# Patient Record
Sex: FEMALE | Race: AMERICAN INDIAN OR ALASKA NATIVE
[De-identification: names, ages, dates, MRNs, and addresses within clinical notes are randomized per-mention and may not be internally consistent; named-entity substitution may affect disease eponyms.]

---

## 2020-01-18 ENCOUNTER — HOSPITAL ENCOUNTER (OUTPATIENT)
Dept: HOSPITAL 5 - ED | Age: 20
Setting detail: OBSERVATION
Discharge: HOME | End: 2020-01-18
Attending: INTERNAL MEDICINE | Admitting: INTERNAL MEDICINE
Payer: COMMERCIAL

## 2020-01-18 VITALS — DIASTOLIC BLOOD PRESSURE: 83 MMHG | SYSTOLIC BLOOD PRESSURE: 131 MMHG

## 2020-01-18 DIAGNOSIS — R42: ICD-10-CM

## 2020-01-18 DIAGNOSIS — F12.90: ICD-10-CM

## 2020-01-18 DIAGNOSIS — G35: Primary | ICD-10-CM

## 2020-01-18 LAB
ALBUMIN SERPL-MCNC: 4.5 G/DL (ref 3.9–5)
ALT SERPL-CCNC: 9 UNITS/L (ref 7–56)
BASOPHILS # (AUTO): 0 K/MM3 (ref 0–0.1)
BASOPHILS NFR BLD AUTO: 0.8 % (ref 0–1.8)
BUN SERPL-MCNC: 17 MG/DL (ref 7–17)
BUN/CREAT SERPL: 19 %
CALCIUM SERPL-MCNC: 9.6 MG/DL (ref 8.4–10.2)
EOSINOPHIL # BLD AUTO: 0.2 K/MM3 (ref 0–0.4)
EOSINOPHIL NFR BLD AUTO: 2.6 % (ref 0–4.3)
HCT VFR BLD CALC: 40.1 % (ref 30.3–42.9)
HEMOLYSIS INDEX: 11
HGB BLD-MCNC: 13.5 GM/DL (ref 10.1–14.3)
LYMPHOCYTES # BLD AUTO: 1.6 K/MM3 (ref 1.2–5.4)
LYMPHOCYTES NFR BLD AUTO: 25.9 % (ref 13.4–35)
MCHC RBC AUTO-ENTMCNC: 34 % (ref 30–34)
MCV RBC AUTO: 87 FL (ref 79–97)
MONOCYTES # (AUTO): 0.4 K/MM3 (ref 0–0.8)
MONOCYTES % (AUTO): 6.4 % (ref 0–7.3)
PLATELET # BLD: 300 K/MM3 (ref 140–440)
RBC # BLD AUTO: 4.64 M/MM3 (ref 3.65–5.03)

## 2020-01-18 PROCEDURE — 99283 EMERGENCY DEPT VISIT LOW MDM: CPT

## 2020-01-18 PROCEDURE — G0378 HOSPITAL OBSERVATION PER HR: HCPCS

## 2020-01-18 PROCEDURE — 96365 THER/PROPH/DIAG IV INF INIT: CPT

## 2020-01-18 PROCEDURE — 99284 EMERGENCY DEPT VISIT MOD MDM: CPT

## 2020-01-18 PROCEDURE — 80053 COMPREHEN METABOLIC PANEL: CPT

## 2020-01-18 PROCEDURE — 85025 COMPLETE CBC W/AUTO DIFF WBC: CPT

## 2020-01-18 PROCEDURE — 36415 COLL VENOUS BLD VENIPUNCTURE: CPT

## 2020-01-18 NOTE — EMERGENCY DEPARTMENT REPORT
ED General Adult HPI





- General


Chief complaint: Dizziness


Stated complaint: DIZZINESS/BLURRED VISION/MIGRAINE


Time Seen by Provider: 01/18/20 12:05


Source: patient


Mode of arrival: Ambulatory


Limitations: No Limitations





- History of Present Illness


Initial comments: 





CC: "I am having a relapse of my MS."





HPI:  Jeannie is a 18 yo female with hx of multiple sclerosis.  She was diagnosed

one year ago.  Recently moved from [place].  She does not havea  neurologist in

the [place] area.  Treated at OSH in October with prednisone and tramadol,  She 

did not improve when h had similar symptoms at that time.  For the past 5 

days,she has had headache, blurry vision, body burning, lightheadedness, 

tremors.  she requests methylprednisolone.  Mild frontal dull headache gradual 

onset typical of previous headaches.


-: Gradual, days(s) (5)


Location: head, left, right, upper extremity, lower extremity


Severity scale (0 -10): 8


Quality: burning (burning body pain), dull (dull headache)


Consistency: constant


Improves with: other (methylprednisolone in the past)


Worsens with: none


Associated Symptoms: headaches, malaise, other (lightheadedness blurred vision)


Treatments Prior to Arrival: none





- Related Data


                                    Allergies











Allergy/AdvReac Type Severity Reaction Status Date / Time


 


No Known Allergies Allergy   Unverified 01/18/20 12:04














ED Review of Systems


ROS: 


Stated complaint: DIZZINESS/BLURRED VISION/MIGRAINE


Other details as noted in HPI





Comment: All other systems reviewed and negative


Constitutional: malaise.  denies: fever


Respiratory: denies: cough


Cardiovascular: denies: chest pain


Gastrointestinal: denies: abdominal pain, nausea, vomiting


Neurological: headache, paresthesias





ED Past Medical Hx





- Past Medical History


Previous Medical History?: Yes


Additional medical history: Multiple Sclerosis





- Surgical History


Past Surgical History?: No





- Social History


Smoking Status: Never Smoker


Substance Use Type: Marijuana





ED Physical Exam





- General


Limitations: No Limitations


General appearance: alert, in no apparent distress





- Head


Head exam: Present: atraumatic, normocephalic





- Eye


Eye exam: Present: normal appearance





- ENT


ENT exam: Present: mucous membranes moist





- Neck


Neck exam: Present: normal inspection, full ROM





- Respiratory


Respiratory exam: Present: normal lung sounds bilaterally.  Absent: respiratory 

distress, wheezes, rales, rhonchi





- Cardiovascular


Cardiovascular Exam: Present: regular rate, normal rhythm, normal heart sounds. 

Absent: systolic murmur, diastolic murmur, rubs, gallop





- GI/Abdominal


GI/Abdominal exam: Present: soft, normal bowel sounds.  Absent: distended, 

tenderness, guarding, rebound





- Extremities Exam


Extremities exam: Present: normal inspection





- Neurological Exam


Neurological exam: Present: alert, oriented X3, CN II-XII intact, normal gait.  

Absent: motor sensory deficit





- Psychiatric


Psychiatric exam: Present: normal affect, normal mood





- Skin


Skin exam: Present: warm, dry, intact, normal color.  Absent: rash





ED Course





                                   Vital Signs











  01/18/20





  12:03


 


Temperature 98.4 F


 


Pulse Rate 86


 


Respiratory 16





Rate 


 


Blood Pressure 131/83


 


O2 Sat by Pulse 100





Oximetry 














ED Medical Decision Making





- Lab Data


Result diagrams: 


                                 01/18/20 12:11





                                 01/18/20 12:11








                         Laboratory Results - last 24 hr











  01/18/20 01/18/20





  12:11 12:11


 


WBC  6.0 


 


RBC  4.64 


 


Hgb  13.5 


 


Hct  40.1 


 


MCV  87 


 


MCH  29 


 


MCHC  34 


 


RDW  16.1 H 


 


Plt Count  300 


 


Lymph % (Auto)  25.9 


 


Mono % (Auto)  6.4 


 


Eos % (Auto)  2.6 


 


Baso % (Auto)  0.8 


 


Lymph #  1.6 


 


Mono #  0.4 


 


Eos #  0.2 


 


Baso #  0.0 


 


Seg Neutrophils %  64.3 


 


Seg Neutrophils #  3.9 


 


Sodium   138


 


Potassium   4.0


 


Chloride   103.4


 


Carbon Dioxide   22


 


Anion Gap   17


 


BUN   17


 


Creatinine   0.9


 


Estimated GFR   > 60


 


BUN/Creatinine Ratio   19


 


Glucose   81


 


Calcium   9.6


 


Total Bilirubin   0.30


 


AST   19


 


ALT   9


 


Alkaline Phosphatase   52


 


Total Protein   7.9


 


Albumin   4.5


 


Albumin/Globulin Ratio   1.3














- Medical Decision Making





Jeannie presents with exacerbation of multiple sclerosis.  She will be admitted 

to the hospitalist service for high-dose IV steroid therapy.


Critical care attestation.: 


If time is entered above; I have spent that time in minutes in the direct care 

of this critically ill patient, excluding procedure time.








ED Disposition


Clinical Impression: 


 Exacerbation of multiple sclerosis





Disposition: DC-01 TO HOME OR SELFCARE


Is pt being admited?: Yes


Does the pt Need Aspirin: No


Condition: Stable

## 2020-01-18 NOTE — EVENT NOTE
Date: 01/18/20





19-year-old with history of multiple sclerosis --no residual deficits comes in 

for blurry vision nausea and slight weakness in both lower extremities.


On examination


Vision is normal


Power is 5 x 5 in all 4 extremities


Gait is normal


Walks slowly


No scanning speech


No diplopia





Discharge diagnosis


Possible MS exacerbation--- but more unlikely


Will give benefit of doubt


IV Solu-Medrol 1 g x 1 dose


Tapering dose of prednisone


40 mg for first 5 days followed by 30 mg of prednisone for the next 5 days 

followed by 20 mg of prednisone for the next 5 days followed by 10 mg of 

prednisone for the next 5 days and then stop.  


Follow-up with [facility] and Dr. Bean bonner

## 2020-01-18 NOTE — EVENT NOTE
ED Screening Note


Date of service: 01/18/20


Time: 12:06


ED Screening Note: 





Pt complains of dizziness, blurred vision, and pins and needles feeling x 5 days




+HA


hx of MS-states feels like her normal flare 


states not currently on meds for MS





This initial assessment/diagnostic orders/clinical plan/treatment(s) is/are 

subject to change based on patients health status, clinical progression and re-

assessment by fellow clinical providers in the ED. Further treatment and workup 

at subsequent clinical providers discretion. Patient/guardian urged not to elope

from the ED as their condition may be serious if not clinically assessed and 

managed. 





Initial orders include: 


labs

## 2020-01-20 ENCOUNTER — HOSPITAL ENCOUNTER (INPATIENT)
Dept: HOSPITAL 5 - ED | Age: 20
LOS: 5 days | Discharge: HOME | DRG: 60 | End: 2020-01-25
Attending: INTERNAL MEDICINE | Admitting: INTERNAL MEDICINE
Payer: COMMERCIAL

## 2020-01-20 DIAGNOSIS — R07.89: ICD-10-CM

## 2020-01-20 DIAGNOSIS — F17.210: ICD-10-CM

## 2020-01-20 DIAGNOSIS — H53.8: ICD-10-CM

## 2020-01-20 DIAGNOSIS — Z82.49: ICD-10-CM

## 2020-01-20 DIAGNOSIS — G35: Primary | ICD-10-CM

## 2020-01-20 DIAGNOSIS — Z90.10: ICD-10-CM

## 2020-01-20 LAB
ALBUMIN SERPL-MCNC: 4.2 G/DL (ref 3.9–5)
ALT SERPL-CCNC: 8 UNITS/L (ref 7–56)
BASOPHILS # (AUTO): 0 K/MM3 (ref 0–0.1)
BASOPHILS NFR BLD AUTO: 0.2 % (ref 0–1.8)
BILIRUB UR QL STRIP: (no result)
BLOOD UR QL VISUAL: (no result)
BUN SERPL-MCNC: 16 MG/DL (ref 7–17)
BUN/CREAT SERPL: 20 %
CALCIUM SERPL-MCNC: 9.5 MG/DL (ref 8.4–10.2)
EOSINOPHIL # BLD AUTO: 0 K/MM3 (ref 0–0.4)
EOSINOPHIL NFR BLD AUTO: 0 % (ref 0–4.3)
HCT VFR BLD CALC: 37.3 % (ref 30.3–42.9)
HEMOLYSIS INDEX: 10
HGB BLD-MCNC: 12.4 GM/DL (ref 10.1–14.3)
LYMPHOCYTES # BLD AUTO: 1.1 K/MM3 (ref 1.2–5.4)
LYMPHOCYTES NFR BLD AUTO: 8.8 % (ref 13.4–35)
MCHC RBC AUTO-ENTMCNC: 33 % (ref 30–34)
MCV RBC AUTO: 87 FL (ref 79–97)
MONOCYTES # (AUTO): 0.6 K/MM3 (ref 0–0.8)
MONOCYTES % (AUTO): 5.1 % (ref 0–7.3)
MUCOUS THREADS #/AREA URNS HPF: (no result) /HPF
PH UR STRIP: 6 [PH] (ref 5–7)
PLATELET # BLD: 299 K/MM3 (ref 140–440)
PROT UR STRIP-MCNC: (no result) MG/DL
RBC # BLD AUTO: 4.32 M/MM3 (ref 3.65–5.03)
RBC #/AREA URNS HPF: 2 /HPF (ref 0–6)
UROBILINOGEN UR-MCNC: < 2 MG/DL (ref ?–2)
WBC #/AREA URNS HPF: 1 /HPF (ref 0–6)

## 2020-01-20 PROCEDURE — 80048 BASIC METABOLIC PNL TOTAL CA: CPT

## 2020-01-20 PROCEDURE — 71046 X-RAY EXAM CHEST 2 VIEWS: CPT

## 2020-01-20 PROCEDURE — A9577 INJ MULTIHANCE: HCPCS

## 2020-01-20 PROCEDURE — 86038 ANTINUCLEAR ANTIBODIES: CPT

## 2020-01-20 PROCEDURE — 36415 COLL VENOUS BLD VENIPUNCTURE: CPT

## 2020-01-20 PROCEDURE — 80053 COMPREHEN METABOLIC PANEL: CPT

## 2020-01-20 PROCEDURE — 93010 ELECTROCARDIOGRAM REPORT: CPT

## 2020-01-20 PROCEDURE — 82607 VITAMIN B-12: CPT

## 2020-01-20 PROCEDURE — 70553 MRI BRAIN STEM W/O & W/DYE: CPT

## 2020-01-20 PROCEDURE — 72156 MRI NECK SPINE W/O & W/DYE: CPT

## 2020-01-20 PROCEDURE — G0378 HOSPITAL OBSERVATION PER HR: HCPCS

## 2020-01-20 PROCEDURE — 96374 THER/PROPH/DIAG INJ IV PUSH: CPT

## 2020-01-20 PROCEDURE — 85652 RBC SED RATE AUTOMATED: CPT

## 2020-01-20 PROCEDURE — 84165 PROTEIN E-PHORESIS SERUM: CPT

## 2020-01-20 PROCEDURE — 81001 URINALYSIS AUTO W/SCOPE: CPT

## 2020-01-20 PROCEDURE — 85025 COMPLETE CBC W/AUTO DIFF WBC: CPT

## 2020-01-20 PROCEDURE — 87476 LYME DIS DNA AMP PROBE: CPT

## 2020-01-20 PROCEDURE — 87116 MYCOBACTERIA CULTURE: CPT

## 2020-01-20 PROCEDURE — 85379 FIBRIN DEGRADATION QUANT: CPT

## 2020-01-20 PROCEDURE — 93005 ELECTROCARDIOGRAM TRACING: CPT

## 2020-01-20 PROCEDURE — 84484 ASSAY OF TROPONIN QUANT: CPT

## 2020-01-20 PROCEDURE — 82652 VIT D 1 25-DIHYDROXY: CPT

## 2020-01-20 PROCEDURE — 83036 HEMOGLOBIN GLYCOSYLATED A1C: CPT

## 2020-01-20 PROCEDURE — 85007 BL SMEAR W/DIFF WBC COUNT: CPT

## 2020-01-20 PROCEDURE — 72157 MRI CHEST SPINE W/O & W/DYE: CPT

## 2020-01-20 PROCEDURE — 81025 URINE PREGNANCY TEST: CPT

## 2020-01-20 PROCEDURE — 70450 CT HEAD/BRAIN W/O DYE: CPT

## 2020-01-20 RX ADMIN — FAMOTIDINE SCH MG: 10 INJECTION, SOLUTION INTRAVENOUS at 23:26

## 2020-01-20 RX ADMIN — DEXTROSE AND SODIUM CHLORIDE SCH MLS/HR: 5; .9 INJECTION, SOLUTION INTRAVENOUS at 23:29

## 2020-01-20 RX ADMIN — Medication SCH ML: at 23:30

## 2020-01-20 RX ADMIN — HEPARIN SODIUM SCH UNIT: 5000 INJECTION, SOLUTION INTRAVENOUS; SUBCUTANEOUS at 23:26

## 2020-01-20 NOTE — EMERGENCY DEPARTMENT REPORT
ED Chest Pain HPI





- General


Chief Complaint: Chest Pain


Stated Complaint: MS RELASPED/CHEST PAIN


Time Seen by Provider: 20 13:44


Source: patient


Mode of arrival: Ambulatory


Limitations: No Limitations





- History of Present Illness


Initial Comments: 





This is a pleasant 19-year-old female with past medical history of multiple 

sclerosis which was diagnosed 2019 who presents the emergency 

department with a chief complaint of headache, dizziness, blurred vision, 

tremors and paresthesias and a burning sensation to her body.  She reports these

symptoms are typical for her normal MS flare symptoms.  She just recently 

relocated from [place] and does not have a neurologist established locally yet.

 She was seen in the ER a few days ago and physician he saw her recommend 

admission however the patient declined and wanted to go home.  Patient states 

she thinks she should've state due to her persistent symptoms.  She states that 

she is having new symptoms of pain substernally in her chest that she describes 

as pressure.  She denies any radiating pain.  States pain is aggravated with 

exertion and is associated nausea.





- Related Data


                                    Allergies











Allergy/AdvReac Type Severity Reaction Status Date / Time


 


No Known Allergies Allergy   Unverified 20 12:04














Heart Score





- HEART Score


History: Slightly suspicious


EKG: Normal


Age: < 45


Risk factors: No known risk factors


Troponin: < normal limit


HEART Score: 0





ED Review of Systems


ROS: 


Stated complaint: MS RELASPED/CHEST PAIN


Other details as noted in HPI





Comment: All other systems reviewed and negative


Constitutional: denies: chills, fever


Eyes: denies: eye pain, eye discharge, vision change


ENT: denies: ear pain, throat pain


Respiratory: denies: cough, shortness of breath, wheezing


Cardiovascular: denies: chest pain, palpitations


Endocrine: no symptoms reported


Gastrointestinal: denies: abdominal pain, nausea, diarrhea


Genitourinary: denies: urgency, dysuria, discharge


Musculoskeletal: denies: joint swelling, arthralgia


Skin: denies: rash, lesions


Neurological: as per HPI, headache, weakness, paresthesias


Psychiatric: denies: anxiety, depression


Hematological/Lymphatic: denies: easy bleeding, easy bruising





ED Past Medical Hx





- Past Medical History


Previous Medical History?: Yes


Additional medical history: Multiple Sclerosis.  fibroids





- Surgical History


Past Surgical History?: Yes


Additional Surgical History: myomectomy





- Social History


Smoking Status: Current Some Day Smoker


Substance Use Type: Alcohol





ED Physical Exam





- General


Limitations: No Limitations


General appearance: alert, in no apparent distress





- Head


Head exam: Present: atraumatic, normocephalic





- Eye


Eye exam: Present: normal appearance, PERRL, EOMI


Pupils: Present: normal accommodation





- ENT


ENT exam: Present: normal exam, normal orophraynx, mucous membranes moist





- Neck


Neck exam: Present: normal inspection, full ROM.  Absent: tenderness, 

meningismus





- Respiratory


Respiratory exam: Present: normal lung sounds bilaterally.  Absent: respiratory 

distress, wheezes, rales, rhonchi, stridor





- Cardiovascular


Cardiovascular Exam: Present: regular rate, normal rhythm, normal heart sounds. 

Absent: systolic murmur, diastolic murmur, rubs, gallop





- GI/Abdominal


GI/Abdominal exam: Present: soft, normal bowel sounds.  Absent: distended, 

tenderness, guarding, rebound, rigid





- Extremities Exam


Extremities exam: Present: normal inspection, full ROM, calf tenderness 

(negative yasir sign bilaterally ).  Absent: tenderness





- Back Exam


Back exam: Present: normal inspection, full ROM.  Absent: tenderness, CVA 

tenderness (R), CVA tenderness (L)





- Neurological Exam


Neurological exam: Present: alert, oriented X3, CN II-XII intact, other (normal 

sensation to the upper and lower extremities. reported weakness to the lower 

extremities but no drift. unable to tolerate resistive ROM. )





- Psychiatric


Psychiatric exam: Present: normal affect, normal mood





- Skin


Skin exam: Present: warm, dry, intact, normal color.  Absent: rash





ED Course


                                   Vital Signs











  20





  13:42


 


Temperature 98.2 F


 


Pulse Rate 83


 


Respiratory 18





Rate 


 


Blood Pressure 123/71


 


O2 Sat by Pulse 100





Oximetry 














BISHNU score





- Bishnu Score


Age > 65: (0) No


Aspirin use within the Past 7 Days: (0) No


3 or more CAD Risk Factors: (0) No


2 or more Angina events in past 24 hrs: (0) No


Known CAD with more than 50% Stenosis: (0) No


Elevated Cardiac Markers: (0) No


ST Deviation Greater than 0.5mm: (0) No


BISHNU Score: 0





ED Medical Decision Making





- Lab Data


Result diagrams: 


                                 20 15:07





                                 20 15:07





- EKG Data


-: EKG Interpreted by Me


EKG shows normal: sinus rhythm


Rate: normal





- EKG Data


When compared to previous EKG there are: previous EKG unavailable





20 15:15


EKG performed at 12:21 pm normal sinus rhythm, no acute ST or T-wave 

abnormalities, no STEMI, normal axis, normal intervals.





- Radiology Data


Radiology results: report reviewed





Patient: ROB ALFORD MR#: [id number]


[id number]


: 2000 Acct:[id number]





Age/Sex: 19 / F ADM Date: 20





Loc: ED


Attending Dr:








Ordering Physician: LAURA CONCEPCION


Date of Service: 20


Procedure(s): CT head/brain wo con


Accession Number(s): [id number]





cc: LAURA CONCEPCION








CT HEAD WITHOUT CONTRAST





INDICATION / CLINICAL INFORMATION: weakness, MS, blurred vision.





TECHNIQUE: Axial imaging performed from the skull apex through the skull base 

without the use of


contrast. Sagittal and coronal reformatted images. All CT scans at this location

are performed


using CT dose reduction for ALARA by means of automated exposure control.





COMPARISON: None available.





FINDINGS:





CEREBRAL PARENCHYMA: There is a solitary subtle centimeter area of diminished 

attenuation in the


right parietal white matter on image 36, series 2. This is a nonspecific finding

but could be


related to demyelination. The remaining brain parenchyma demonstrates normal 

density.


HEMORRHAGE: None.


EXTRA-AXIAL SPACES: Normal in size and morphology for the patient's age.


VENTRICULAR SYSTEM: Normal in size and morphology for the patient's age.


MIDLINE SHIFT OR HERNIATION: None.





CEREBELLUM / BRAINSTEM: No significant abnormality.





CALVARIUM: No significant abnormality.


ORBITS: Normal as visualized.


PARANASAL SINUSES / MASTOID AIR CELLS: Normal as visualized.


SOFT TISSUES of HEAD: No significant abnormality.





ADDITIONAL FINDINGS: None.





IMPRESSION:


Solitary 1 cm area of diminished attenuation in the right parietal white matter.

This could


represent focal demyelination although other etiologies are not entirely exc

luded. Please correlate


with the patient. This could be further evaluated with MRI with and without 

contrast if needed.





Signer Name: Fredi Medina Jr, MD


Signed: 2020 4:25 PM


Workstation Name: [id number]








Transcribed By: TTR


Dictated By: FREDI MEDINA JR, MD


Electronically Authenticated By: FREDI MEDINA JR, MD


Signed Date/Time: 20 1625





ge/Sex: 19 / F ADM Date: 20





Loc: ED


Attending Dr:








Ordering Physician: LAURA AHMADI


Date of Service: 20


Procedure(s): XR chest routine 2V


Accession Number(s): [id number]





cc: LAURA AHMADI





Fluoro Time In Minutes:





CHEST 2 VIEWS





INDICATION / CLINICAL INFORMATION:


Chest Pain.





COMPARISON:


None available.





FINDINGS:





SUPPORT DEVICES: None.





HEART / MEDIASTINUM: No significant abnormality.





LUNGS / PLEURA: No significant pulmonary or pleural abnormality. No 

pneumothorax.





ADDITIONAL FINDINGS: No significant additional findings.





IMPRESSION:


1. No acute findings.





Signer Name: Kaz Jeter MD


Signed: 2020 4:46 PM


Workstation Name: [id number]








Transcribed By: EMIGDIO


Dictated By: Kaz Jeter MD


Electronically Authenticated By: Kaz Jeter MD


Signed Date/Time: 20 2856








- Medical Decision Making





Patient presented with classic symptoms of MS flare including blurry vision, 

generalized weakness worse in the lower extremities, she was seen in the ER 2 

days ago given Solu-Medrol and outpatient steroid taper however reports she is 

feeling worse.  She also reports she is also having chest pain today.  She is 

technically  PERC negative however due to her reporting she had pain with 

breathing and did order a d-dimer that was negative.  Patient had a unremarkable

EKG with no signs of acute ischemia and negative troponin and her heart score 

was low making acute ACS event unlikely.  Patient had no signs of pneumothorax 

or pneumonia on x-ray.  There is no tearing or ripping pain to the back, history

of connective tissue disease, history smoking or widening mediastinum making 

aortic dissection unlikely.  Normal equal radial pulses making this unlikely.  

Her neurologic exam was relatively normal other than mild weakness bilateral 

extremity is.  Discussed with hospitalist Dr. Flores who agreed to bring the 

patient in the hospital for steroids and further workup/neurology consultation. 

Patient is agreeable to this plan and all questions were answered.





- Differential Diagnosis


MS flare, PE, acs 


Critical care attestation.: 


If time is entered above; I have spent that time in minutes in the direct care 

of this critically ill patient, excluding procedure time.








ED Disposition


Clinical Impression: 


 Exacerbation of multiple sclerosis, Acute nonspecific chest pain with low risk 

of coronary artery disease





Disposition: DC-09 OP ADMIT IP TO THIS HOSP


Is pt being admited?: Yes


Condition: Stable

## 2020-01-20 NOTE — CAT SCAN REPORT
CT HEAD WITHOUT CONTRAST



INDICATION / CLINICAL INFORMATION:  weakness, MS, blurred vision.



TECHNIQUE: Axial imaging performed from the skull apex through the skull base without the use of cont
rast.  Sagittal and coronal reformatted images.  All CT scans at this location are performed using CT
 dose reduction for ALARA by means of automated exposure control. 



COMPARISON: None available.



FINDINGS:



CEREBRAL PARENCHYMA: There is a solitary subtle centimeter area of diminished attenuation in the righ
t parietal white matter on image 36, series 2. This is a nonspecific finding but could be related to 
demyelination. The remaining brain parenchyma demonstrates normal density.

HEMORRHAGE: None.

EXTRA-AXIAL SPACES: Normal in size and morphology for the patient's age.

VENTRICULAR SYSTEM: Normal in size and morphology for the patient's age.

MIDLINE SHIFT OR HERNIATION: None.



CEREBELLUM / BRAINSTEM: No significant abnormality.



CALVARIUM: No significant abnormality.

ORBITS: Normal as visualized.

PARANASAL SINUSES / MASTOID AIR CELLS: Normal as visualized.

SOFT TISSUES of HEAD: No significant abnormality.



ADDITIONAL FINDINGS: None.



IMPRESSION:

Solitary 1 cm area of diminished attenuation in the right parietal white matter. This could represent
 focal demyelination although other etiologies are not entirely excluded. Please correlate with the p
atient. This could be further evaluated with MRI with and without contrast if needed.



Signer Name: Fredi Medina Jr, MD 

Signed: 1/20/2020 4:25 PM

 Workstation Name: [id number]

## 2020-01-20 NOTE — EVENT NOTE
ED Screening Note


ED Screening Note: 





18 y/o female with pmh of MS presents to ED c/o worsening flare up was seen a 

couple days ago and refused admission and now wants to accept all recommend 

treatments. symptoms have evolved to include chest pain and sob in relation to 

the blurred vision, weakness, dizziness, and myalgia. 





The patient was seen in triage for as above


Labs/imaging ordered to evaluate for a cause of this complaint.


Vital signs reviewed, patient awake and alert in NAD.








This initial assessment/diagnostic orders/clinical plan/treatment(s) is/are 

subject to change based on patients health status, clinical progression and re-

assessment by fellow clinical providers in the ED. Further treatment and workup 

at subsequent clinical providers discretion. Patient/guardian urged not to elope

from the ED as their condition may be serious if not clinically assessed and 

managed. 





Initial orders include:

## 2020-01-20 NOTE — XRAY REPORT
CHEST 2 VIEWS 



INDICATION / CLINICAL INFORMATION:

Chest Pain.



COMPARISON: 

None available.



FINDINGS:



SUPPORT DEVICES: None.



HEART / MEDIASTINUM: No significant abnormality. 



LUNGS / PLEURA: No significant pulmonary or pleural abnormality. No pneumothorax. 



ADDITIONAL FINDINGS: No significant additional findings.



IMPRESSION:

1. No acute findings.



Signer Name: Kaz Jeter MD 

Signed: 1/20/2020 4:46 PM

 Workstation Name: [id number]

## 2020-01-21 LAB
ALBUMIN SERPL-MCNC: 3.4 G/DL (ref 3.9–5)
ALT SERPL-CCNC: 7 UNITS/L (ref 7–56)
BASOPHILS # (AUTO): 0 K/MM3 (ref 0–0.1)
BASOPHILS NFR BLD AUTO: 0.3 % (ref 0–1.8)
BUN SERPL-MCNC: 16 MG/DL (ref 7–17)
BUN/CREAT SERPL: 20 %
CALCIUM SERPL-MCNC: 8.6 MG/DL (ref 8.4–10.2)
EOSINOPHIL # BLD AUTO: 0.1 K/MM3 (ref 0–0.4)
EOSINOPHIL NFR BLD AUTO: 0.7 % (ref 0–4.3)
HCT VFR BLD CALC: 33.7 % (ref 30.3–42.9)
HEMOLYSIS INDEX: 7
HGB BLD-MCNC: 11.4 GM/DL (ref 10.1–14.3)
LYMPHOCYTES # BLD AUTO: 2.9 K/MM3 (ref 1.2–5.4)
LYMPHOCYTES NFR BLD AUTO: 34.6 % (ref 13.4–35)
MCHC RBC AUTO-ENTMCNC: 34 % (ref 30–34)
MCV RBC AUTO: 86 FL (ref 79–97)
MONOCYTES # (AUTO): 0.7 K/MM3 (ref 0–0.8)
MONOCYTES % (AUTO): 8 % (ref 0–7.3)
PLATELET # BLD: 267 K/MM3 (ref 140–440)
RBC # BLD AUTO: 3.92 M/MM3 (ref 3.65–5.03)

## 2020-01-21 RX ADMIN — Medication SCH ML: at 23:06

## 2020-01-21 RX ADMIN — Medication SCH ML: at 11:18

## 2020-01-21 RX ADMIN — FAMOTIDINE SCH MG: 10 INJECTION, SOLUTION INTRAVENOUS at 11:17

## 2020-01-21 RX ADMIN — HEPARIN SODIUM SCH UNIT: 5000 INJECTION, SOLUTION INTRAVENOUS; SUBCUTANEOUS at 11:16

## 2020-01-21 RX ADMIN — FAMOTIDINE SCH MG: 10 INJECTION, SOLUTION INTRAVENOUS at 23:05

## 2020-01-21 RX ADMIN — HEPARIN SODIUM SCH UNIT: 5000 INJECTION, SOLUTION INTRAVENOUS; SUBCUTANEOUS at 22:05

## 2020-01-21 RX ADMIN — DEXTROSE AND SODIUM CHLORIDE SCH MLS/HR: 5; .9 INJECTION, SOLUTION INTRAVENOUS at 13:52

## 2020-01-21 RX ADMIN — METHYLPREDNISOLONE SODIUM SUCCINATE SCH MLS/HR: 1 INJECTION, POWDER, LYOPHILIZED, FOR SOLUTION INTRAMUSCULAR; INTRAVENOUS at 11:31

## 2020-01-21 NOTE — PROGRESS NOTE
Assessment and Plan





- Patient Problems


(1) Exacerbation of multiple sclerosis


Current Visit: Yes   Status: Acute   


Plan to address problem: 


IV solumedrol 1 gm q 24 h x5 days








(2) Acute nonspecific chest pain with low risk of coronary artery disease


Current Visit: Yes   Status: Acute   


Plan to address problem: 


No further w/u








(3) DVT prophylaxis


Current Visit: Yes   Status: Acute   


Plan to address problem: 


on Heparin and GI prophylaxis








Subjective


Date of service: 01/21/20





Objective





- Constitutional


Vitals: 


                               Vital Signs - 12hr











  01/21/20





  05:17


 


Temperature 98.2 F


 


Pulse Rate 68


 


Respiratory 16





Rate 


 


Blood Pressure 98/45


 


O2 Sat by Pulse 98





Oximetry 











General appearance: Present: no acute distress, well-nourished





- EENT


Eyes: PERRL, EOM intact


ENT: hearing intact, clear oral mucosa


Ears: bilateral: normal





- Neck


Neck: supple, normal ROM





- Respiratory


Respiratory effort: normal


Respiratory: bilateral: CTA





- Breasts


Breasts: normal





- Cardiovascular


Heart rate: 78


Rhythm: regular


Heart Sounds: Present: S1 & S2.  Absent: gallop, rub


Extremities: pulses intact, No edema, normal color, Full ROM





- Gastrointestinal


General gastrointestinal: Present: soft, non-tender, non-distended, normal bowel

sounds





- Genitourinary


Female genitourinary: normal





- Integumentary


Integumentary: clear, warm, dry





- Musculoskeletal


Musculoskeletal: 1, strength equal bilaterally





- Neurologic


Neurologic: moves all extremities, other (Power diminished in both lower 

extremities)





- Psychiatric


Psychiatric: memory intact, appropriate mood/affect, intact judgment & insight





- Labs


CBC & Chem 7: 


                                 01/21/20 05:30





                                 01/21/20 05:30


Labs: 


                              Abnormal lab results











  01/20/20 01/21/20 01/21/20 Range/Units





  15:07 05:30 05:30 


 


RDW   16.5 H   (13.2-15.2)  %


 


Mono % (Auto)   8.0 H   (0.0-7.3)  %


 


Chloride  107.5 H   109.6 H  ()  mmol/L


 


Carbon Dioxide  21 L   21 L  (22-30)  mmol/L


 


Glucose  103 H    ()  mg/dL


 


Total Protein    5.9 L  (6.3-8.2)  g/dL


 


Albumin    3.4 L  (3.9-5)  g/dL

## 2020-01-21 NOTE — MAGNETIC RESONANCE REPORT
MR brain wo/w con



INDICATION / CLINICAL INFORMATION:

19 years Female; MS .



TECHNIQUE: 

Multiplanar, multisequence MR images of the brain were obtained.



COMPARISON: 

None available.



FINDINGS:



BRAIN / INTRACRANIAL CONTENTS: No acute hemorrhage, mass effect, midline shift, hydrocephalus, or acu
te, large territorial infarct. No chronic infarct or atrophy. 



Significant white matter disease is seen bilaterally, consistent with patient's history of demyelinat
ing disease. There is minimal involvement of the white matter of the cerebellar hemispheres as well. 
Minimal pontine disease noted. There are a few punctate foci of enhancement identified, including the
 deep white matter of the mid and posterior left cerebral hemisphere, as well as the kelle. Acute demy
elination in these regions would certainly be a consideration. Finding in the kelle might also represe
nt a cavernous malformation.



CRANIOCERVICAL JUNCTION: No significant abnormality.

VASCULAR FLOW-VOIDS: No significant abnormality.



ORBITS: No significant abnormality of visualized orbits.

SINUSES / MASTOIDS: Mild mucosal thickening seen in the ethmoids, as well as the mastoids.



ADDITIONAL FINDINGS: None. 



IMPRESSION:

1. Significant white matter disease as described above, consistent with patient's history of demyelin
ating disease. Areas of presumed, acute demyelination noted.



Signer Name: Ralph Ignacio MD, III 

Signed: 1/21/2020 11:41 PM

 Workstation Name: [email or web address] [email or web address] [id number]

## 2020-01-21 NOTE — HISTORY AND PHYSICAL REPORT
History of Present Illness


Date of examination: 01/20/20


Date of admission: 


01/20/20 16:53





Chief complaint: 


Weakness in both lower extremities-3/4 days





History of present illness: 


19-year-old female with past medical history of multiple sclerosis which was 

diagnosed February 2019 presents the emergency department with a chief complaint

of headache, dizziness, blurred vision, tremors and paresthesias and a burning 

sensation to her body.  She reports these symptoms are typical for her normal MS

flare symptoms.  She just recently relocated from [place] and does not have a 

neurologist established locally yet.  She was seen in the ER a few days ago and 

physician who  saw her recommend admission however the patient declined and 

wanted to go home.  Patient states she thinks she should've stayed  due to her 

persistent symptoms.  She states that she is having new symptoms of pain 

substernally in her chest that she describes as pressure.  She denies any 

radiating pain.  States pain is aggravated with exertion and is associated 

nausea.





Feb./2019  she was diagnosed with MS in [place]. According to her she had MRI 

brain and LP and was started on tecfedera but she could not take due to side of 

nausea she stopped medication after a month , she denied follow up with 

neurology , according to her since last year she had at least 6 relapse all same

she got X3 treatment with IV steroid . 








Past Medical History


Previous Medical History?: Yes


Additional medical history: Multiple Sclerosis.  fibroids





Surgical History


Past Surgical History?: Yes


Additional Surgical History: myomectomy





Social History  


Smoking Status: Current Some Day Smoker


Substance Use Type: Alcohol  





Family History


Htn





 Review of Systems


ROS: 


Stated complaint: MS RELASPED/CHEST PAIN


Other details as noted in HPI





Comment: All other systems reviewed and negative


Constitutional: denies: chills, fever


Eyes: denies: eye pain, eye discharge, vision change


ENT: denies: ear pain, throat pain


Respiratory: denies: cough, shortness of breath, wheezing


Cardiovascular: denies: chest pain, palpitations


Endocrine: no symptoms reported


Gastrointestinal: denies: abdominal pain, nausea, diarrhea


Genitourinary: denies: urgency, dysuria, discharge


Musculoskeletal: denies: joint swelling, arthralgia,weakness in both lower 

extremities.


Skin: denies: rash, lesions


Neurological: as per HPI, headache, weakness, paresthesias


Psychiatric: denies: anxiety, depression


Hematological/Lymphatic: denies: easy bleeding, easy bruising














Medications and Allergies


                                    Allergies











Allergy/AdvReac Type Severity Reaction Status Date / Time


 


No Known Allergies Allergy   Unverified 01/18/20 12:04











                                Home Medications











 Medication  Instructions  Recorded  Confirmed  Last Taken  Type


 


No Known Home Medications [No  01/20/20 01/20/20 Unknown History





Reported Home Medications]     











Active Meds: 


Active Medications





Acetaminophen (Tylenol)  650 mg PO Q4H PRN


   PRN Reason: Pain MILD(1-3)/Fever >100.5/HA


Famotidine (Pepcid)  20 mg IV BID [facility]


   Last Admin: 01/21/20 11:17 Dose:  20 mg


   Documented by: 


Heparin Sodium (Porcine) (Heparin)  5,000 unit SUB-Q Q12HR [facility]


   Last Admin: 01/21/20 11:16 Dose:  5,000 unit


   Documented by: 


Hydromorphone HCl (Dilaudid)  0.5 mg IV Q3H PRN


   PRN Reason: Pain , Severe (7-10)


Dextrose/Sodium Chloride (D5ns)  1,000 mls @ 75 mls/hr IV AS DIRECT [facility]


   Last Admin: 01/21/20 13:52 Dose:  75 mls/hr


   Documented by: 


Methylprednisolone Sodium Succinate 1,000 mg/ Sodium Chloride  250 mls @ 250 

mls/hr IV Q24H [facility]


   Last Admin: 01/21/20 11:31 Dose:  250 mls/hr


   Documented by: 


Ondansetron HCl (Zofran)  4 mg IV Q8H PRN


   PRN Reason: Nausea And Vomiting


Sodium Chloride (Sodium Chloride Flush Syringe 10 Ml)  10 ml IV BID [facility]


   Last Admin: 01/21/20 11:18 Dose:  10 ml


   Documented by: 


Sodium Chloride (Sodium Chloride Flush Syringe 10 Ml)  10 ml IV PRN PRN


   PRN Reason: LINE FLUSH











Exam





- Constitutional


Vitals: 


                                        











Temp Pulse Resp BP Pulse Ox


 


 98.2 F   68   16   98/45   98 


 


 01/21/20 05:17  01/21/20 05:17  01/21/20 05:17  01/21/20 05:17  01/21/20 05:17











General appearance: Present: no acute distress, well-nourished





- EENT


Eyes: Present: PERRL


ENT: hearing intact, clear oral mucosa





- Neck


Neck: Present: supple, normal ROM





- Respiratory


Respiratory effort: normal


Respiratory: bilateral: CTA





- Cardiovascular


Heart rate: 78


Rhythm: regular


Heart Sounds: Present: S1 & S2.  Absent: rub, click





- Extremities


Extremities: no ischemia, pulses intact, pulses symmetrical, No edema


Extremity abnormal: other (Power diminished in both lower extremities.4/5 power)


Peripheral Pulses: within normal limits





- Abdominal


General gastrointestinal: Present: soft, non-tender, non-distended, normal bowel

sounds


Female genitourinary: Present: normal





- Integumentary


Integumentary: Present: clear, warm, dry





- Musculoskeletal


Musculoskeletal: gait normal, strength equal bilaterally





- Psychiatric


Psychiatric: appropriate mood/affect, intact judgment & insight





- Neurologic


Neurologic: CNII-XII intact, moves all extremities, other (power both lower 

extremities is diminished)





ALEX score





- Alex Score


Age > 65: (0) No


Aspirin use within the Past 7 Days: (0) No


3 or more CAD Risk Factors: (0) No


2 or more Angina events in past 24 hrs: (0) No


Known CAD with more than 50% Stenosis: (0) No


Elevated Cardiac Markers: (0) No


ST Deviation Greater than 0.5mm: (0) No


ALEX Score: 0





Results





- Labs


CBC & Chem 7: 


                                 01/21/20 05:30





                                 01/21/20 05:30


Labs: 


                             Laboratory Last Values











WBC  8.4 K/mm3 (4.5-11.0)   01/21/20  05:30    


 


RBC  3.92 M/mm3 (3.65-5.03)   01/21/20  05:30    


 


Hgb  11.4 gm/dl (10.1-14.3)   01/21/20  05:30    


 


Hct  33.7 % (30.3-42.9)   01/21/20  05:30    


 


MCV  86 fl (79-97)   01/21/20  05:30    


 


MCH  29 pg (28-32)   01/21/20  05:30    


 


MCHC  34 % (30-34)   01/21/20  05:30    


 


RDW  16.5 % (13.2-15.2)  H  01/21/20  05:30    


 


Plt Count  267 K/mm3 (140-440)   01/21/20  05:30    


 


Lymph % (Auto)  34.6 % (13.4-35.0)   01/21/20  05:30    


 


Mono % (Auto)  8.0 % (0.0-7.3)  H  01/21/20  05:30    


 


Eos % (Auto)  0.7 % (0.0-4.3)   01/21/20  05:30    


 


Baso % (Auto)  0.3 % (0.0-1.8)   01/21/20  05:30    


 


Lymph #  2.9 K/mm3 (1.2-5.4)   01/21/20  05:30    


 


Mono #  0.7 K/mm3 (0.0-0.8)   01/21/20  05:30    


 


Eos #  0.1 K/mm3 (0.0-0.4)   01/21/20  05:30    


 


Baso #  0.0 K/mm3 (0.0-0.1)   01/21/20  05:30    


 


Seg Neutrophils %  56.4 % (40.0-70.0)   01/21/20  05:30    


 


Seg Neutrophils #  4.7 K/mm3 (1.8-7.7)   01/21/20  05:30    


 


ESR  4 mm/Hr (0-20)   01/21/20  12:00    


 


D-Dimer  224.77 ng/mlDDU (0-234)   01/20/20  15:32    


 


Sodium  143 mmol/L (137-145)   01/21/20  05:30    


 


Potassium  3.8 mmol/L (3.6-5.0)   01/21/20  05:30    


 


Chloride  109.6 mmol/L ()  H  01/21/20  05:30    


 


Carbon Dioxide  21 mmol/L (22-30)  L  01/21/20  05:30    


 


Anion Gap  16 mmol/L  01/21/20  05:30    


 


BUN  16 mg/dL (7-17)   01/21/20  05:30    


 


Creatinine  0.8 mg/dL (0.7-1.2)   01/21/20  05:30    


 


Estimated GFR  > 60 ml/min  01/21/20  05:30    


 


BUN/Creatinine Ratio  20 %  01/21/20  05:30    


 


Glucose  80 mg/dL ()   01/21/20  05:30    


 


Hemoglobin A1c  4.9 % (4-6)   01/21/20  05:30    


 


Calcium  8.6 mg/dL (8.4-10.2)   01/21/20  05:30    


 


Total Bilirubin  0.20 mg/dL (0.1-1.2)   01/21/20  05:30    


 


AST  11 units/L (5-40)   01/21/20  05:30    


 


ALT  7 units/L (7-56)   01/21/20  05:30    


 


Alkaline Phosphatase  41 units/L ()   01/21/20  05:30    


 


Troponin T  < 0.010 ng/mL (0.00-0.029)   01/20/20  15:32    


 


Total Protein  5.9 g/dL (6.3-8.2)  L  01/21/20  05:30    


 


Albumin  3.4 g/dL (3.9-5)  L  01/21/20  05:30    


 


Albumin/Globulin Ratio  1.4 %  01/21/20  05:30    


 


Vitamin B12  261.4 pg/mL (211-911)   01/21/20  12:00    


 


Urine Color  Yellow  (Yellow)   01/20/20  14:59    


 


Urine Turbidity  Clear  (Clear)   01/20/20  14:59    


 


Urine pH  6.0  (5.0-7.0)   01/20/20  14:59    


 


Ur Specific Gravity  1.029  (1.003-1.030)   01/20/20  14:59    


 


Urine Protein  <15 mg/dl mg/dL (Negative)   01/20/20  14:59    


 


Urine Glucose (UA)  Neg mg/dL (Negative)   01/20/20  14:59    


 


Urine Ketones  Neg mg/dL (Negative)   01/20/20  14:59    


 


Urine Blood  Mod  (Negative)   01/20/20  14:59    


 


Urine Nitrite  Neg  (Negative)   01/20/20  14:59    


 


Urine Bilirubin  Neg  (Negative)   01/20/20  14:59    


 


Urine Urobilinogen  < 2.0 mg/dL (<2.0)   01/20/20  14:59    


 


Ur Leukocyte Esterase  Neg  (Negative)   01/20/20  14:59    


 


Urine WBC (Auto)  1.0 /HPF (0.0-6.0)   01/20/20  14:59    


 


Urine RBC (Auto)  2.0 /HPF (0.0-6.0)   01/20/20  14:59    


 


U Epithel Cells (Auto)  2.0 /HPF (0-13.0)   01/20/20  14:59    


 


Urine Mucus  1+ /HPF  01/20/20  14:59    


 


Urine HCG, Qual  Negative  (Negative)   01/20/20  14:59    














- Imaging and Cardiology


Chest x-ray: report reviewed (NA[facility])


Imaging and Cardiology: 


Head CT


IMPRESSION: Solitary 1 cm area of diminished attenuation in the right parietal 

white matter. This could represent focal demyelination although other etiologies

are not entirely excluded. Please correlate with the patient. This could be 

further evaluated with MRI with and without contrast if needed. 








Assessment and Plan


Advance Directives: Yes (Full code)


VTE prophylaxis?: Chemical


Plan of care discussed with patient/family: Yes





- Patient Problems


(1) Exacerbation of multiple sclerosis


Current Visit: Yes   Status: Acute   


Plan to address problem: 


IV solumedrol 1 gm q 24 h x5 days








(2) Acute nonspecific chest pain with low risk of coronary artery disease


Current Visit: Yes   Status: Acute   


Plan to address problem: 


No further w/u








(3) DVT prophylaxis


Current Visit: Yes   Status: Acute   


Plan to address problem: 


on Heparin and GI prophylaxis

## 2020-01-21 NOTE — MAGNETIC RESONANCE REPORT
MR cervical spine wo/w con



INDICATION / CLINICAL INFORMATION:

19 years Female; MS .



TECHNIQUE:

Multisequence, multiplanar images of the cervical spine were obtained. Motion artifact.



COMPARISON: 

None available.



FINDINGS:



CRANIOCERVICAL JUNCTION:No significant abnormality.

ALIGNMENT: No significant abnormality.

VERTEBRAE:Grossly normal marrow signal and vertebral body height for age.

VISUALIZED SPINAL CORD: Cord lesions are identified at C3-C4, C7-T1, and perhaps at C2-3. No definiti
ve signs of enhancement seen to suggest acute demyelination.



INTERVERTEBRAL DISCS: Grossly normal in height and signal intensity.

LEVEL-BY-LEVEL ANALYSIS:



C2-3: No significant abnormality.

 

C3-4: No significant abnormality.



C4-5: No significant abnormality.



C5-6: Mild disc bulge seen at C5-6, without significant sequela.



C6-7: No significant abnormality.



C7-T1: No significant abnormality.



PARASPINAL SOFT TISSUES: No significant abnormality.



ADDITIONAL FINDINGS: None.



IMPRESSION:

1. Multiple areas of cord signal abnormality as described above, most consistent appearance with the 
monitoring disease.



Signer Name: Ralph Ignacio MD, III 

Signed: 1/21/2020 11:33 PM

 Workstation Name: [facility] [email or web address] [id number]

## 2020-01-21 NOTE — CONSULTATION
History of Present Illness


Consult date: 01/21/20


Reason for Consult: MS ?


History of present illness: 





This is 19 years old female presented to hospital with recurrent blurred vision 

and tremor both upper and dizziness symptoms started 7 days ago according to her

her problem go back to last feb./2019 when she was diagnosed with MS in [place]

according to her she had MRI brain and LP and was started on tecfedera but she 

could not take due to side of nausea she stopped medication after a month , she 

denied follow up with neurology , according to her since last year she had at 

least 6 relapse all same she got X3 treatment with IV steroid  





Medications and Allergies


                                    Allergies











Allergy/AdvReac Type Severity Reaction Status Date / Time


 


No Known Allergies Allergy   Unverified 01/18/20 12:04











                                Home Medications











 Medication  Instructions  Recorded  Confirmed  Last Taken  Type


 


No Known Home Medications [No  01/20/20 01/20/20 Unknown History





Reported Home Medications]     











Active Meds: 


Active Medications





Acetaminophen (Tylenol)  650 mg PO Q4H PRN


   PRN Reason: Pain MILD(1-3)/Fever >100.5/HA


Famotidine (Pepcid)  20 mg IV BID [facility]


   Last Admin: 01/20/20 23:26 Dose:  20 mg


   Documented by: 


Heparin Sodium (Porcine) (Heparin)  5,000 unit SUB-Q Q12HR [facility]


   Last Admin: 01/20/20 23:26 Dose:  5,000 unit


   Documented by: 


Hydromorphone HCl (Dilaudid)  0.5 mg IV Q3H PRN


   PRN Reason: Pain , Severe (7-10)


Dextrose/Sodium Chloride (D5ns)  1,000 mls @ 75 mls/hr IV AS DIRECT [facility]


   Last Admin: 01/20/20 23:29 Dose:  75 mls/hr


   Documented by: 


Methylprednisolone Sodium Succinate 1,000 mg/ Sodium Chloride  250 mls @ 250 

mls/hr IV Q24H [facility]


Ondansetron HCl (Zofran)  4 mg IV Q8H PRN


   PRN Reason: Nausea And Vomiting


Sodium Chloride (Sodium Chloride Flush Syringe 10 Ml)  10 ml IV BID [facility]


   Last Admin: 01/20/20 23:30 Dose:  10 ml


   Documented by: 


Sodium Chloride (Sodium Chloride Flush Syringe 10 Ml)  10 ml IV PRN PRN


   PRN Reason: LINE FLUSH











Review of Systems


Constitutional: weakness


Eyes: bilateral: blurred vision


Neurological: parathesias (left side), tremors (bilteral upper), balance 

difficulties (feel dizzy and unsteady)





Physical Examination





- Vital Signs


Vital Signs: 


                                   Vital Signs











Temp Pulse Resp BP Pulse Ox


 


 98.2 F   83   18   123/71   100 


 


 01/20/20 13:42  01/20/20 13:42  01/20/20 13:42  01/20/20 13:42  01/20/20 13:42














- Constitutional


General appearance: comfortable





- EENT


EENT: Present: PERRL





- Respiratory


Respiratory: Present: chest non-tender





- Cardiovascular


Cardiovascular: Present: regular rate, no murmurs


Extremities: Present: no peripheral edema bilatateraly, no clubbing, cyanosis, 

no inflammation, no ischemia or petechiae





- Gastrointestinal


Gastrointestinal: Present: normoactive bowel sounds





- Integumentary


Integumentary: Present: normal





- Neurologic


Cranial nerve examination: PERRL, EOMI


Speech examination: intact


Sensorimotor examination: other (subjective decrease sensation left side upper 

and lower as well as face )


Motor examination - right side: 4/5: biceps, triceps, wrist flexion, wrist 

extension, , hip flexors, knee extensors, dorsiflexion, toe extension (EHL),

plantarflexion


Motor examination - left side: 4/5: biceps, triceps, wrist flexion, wrist 

extension, , hip flexors, knee extensors, dorsiflexion, toe extension (EHL),

plantarflexion


Reflexes: 2+: ankle, bicep, knee, tricep


Cerebellar examination: other (gait is steady)





Results





- Laboratory Findings


CBC and BMP: 


                                 01/21/20 05:30





                                 01/21/20 05:30


Abnormal Lab Findings: 


                                  Abnormal Labs











  01/20/20 01/20/20 01/21/20





  15:07 15:07 05:30


 


WBC  12.7 H  


 


RDW  17.0 H   16.5 H


 


Lymph % (Auto)  8.8 L  


 


Mono % (Auto)    8.0 H


 


Lymph #  1.1 L  


 


Seg Neutrophils %  85.9 H  


 


Seg Neutrophils #  10.9 H  


 


Chloride   107.5 H 


 


Carbon Dioxide   21 L 


 


Glucose   103 H 


 


Total Protein   


 


Albumin   














  01/21/20





  05:30


 


WBC 


 


RDW 


 


Lymph % (Auto) 


 


Mono % (Auto) 


 


Lymph # 


 


Seg Neutrophils % 


 


Seg Neutrophils # 


 


Chloride  109.6 H


 


Carbon Dioxide  21 L


 


Glucose 


 


Total Protein  5.9 L


 


Albumin  3.4 L














Assessment and Plan





1- This is 19 ys old female presented with complain of bilteral blurred vision 

with no eye pain or visual impairment she is with left sided decrease sensation 

no weakness and gait is steady she is complain of tremor !!! according to her 

she was diagnosed with MS over a year ago 2/2019 started on tecfedera but she 

could not tolerate due to side effect of nausea.


2- Recurrent non specific symptoms similar to above X 6 times last year she got 

X3 treatment with steroid IV


3- Recreational drug intake 


4- pregnancy test is negative.








PLAN


1- Urine drug screen


2- MRI brain and cervical/dorsal spine with and without qd.


3- ESR,RONNA,lyme, [place] ,HIV,b12,folate,VitD ,SPEP,SIEP


4- Consider steroid treatment after MRI brain?


5- instructed no recreational drug


6- Need follow up with neurology





will follow

## 2020-01-21 NOTE — MAGNETIC RESONANCE REPORT
. MR thoracic spine wo/w con



INDICATION / CLINICAL INFORMATION:

19 years Female; MRI Dorsal spine r/o MS.



TECHNIQUE:

Multisequence, multiplanar images of the thoracic spine were obtained. Motion artifact



COMPARISON: 

None available.



FINDINGS:



ALIGNMENT: Normal thoracic kyphosis without significant scoliosis.

VERTEBRAE:Grossly normal marrow signal and vertebral body height for age.  No significant facet joint
 disease or osseous foraminal narrowing appreciated. 

 

VISUALIZED SPINAL CORD: Multiple areas of cord signal abnormality are seen, with most marked finding 
seen at the T7 level. Smaller lesions suggested at T4-5, T6-7, T8, and T9 levels. There may be a smal
l lesion at T11. Overall, I do not appreciate any definitive signs of abnormal enhancement, although 
postcontrast imaging is limited by motion.



INTERVERTEBRAL DISCS: No significant abnormality.



PARASPINAL SOFT TISSUES: No significant abnormality.



ADDITIONAL FINDINGS: None.



IMPRESSION:

1. Multiple cord lesions seen, consistent with patient's history of possible sclerosis.



Signer Name: Ralph Ignacio MD, III 

Signed: 1/21/2020 11:36 PM

 Workstation Name: [email or web address]

## 2020-01-22 LAB
BAND NEUTROPHILS # (MANUAL): 0 K/MM3
BUN SERPL-MCNC: 10 MG/DL (ref 7–17)
BUN/CREAT SERPL: 20 %
CALCIUM SERPL-MCNC: 9.1 MG/DL (ref 8.4–10.2)
HCT VFR BLD CALC: 35.9 % (ref 30.3–42.9)
HEMOLYSIS INDEX: 13
HGB BLD-MCNC: 12 GM/DL (ref 10.1–14.3)
MCHC RBC AUTO-ENTMCNC: 33 % (ref 30–34)
MCV RBC AUTO: 87 FL (ref 79–97)
MYELOCYTES # (MANUAL): 0 K/MM3
PLATELET # BLD: 287 K/MM3 (ref 140–440)
PROMYELOCYTES # (MANUAL): 0 K/MM3
RBC # BLD AUTO: 4.14 M/MM3 (ref 3.65–5.03)
TOTAL CELLS COUNTED BLD: 100

## 2020-01-22 RX ADMIN — CHOLECALCIFEROL TAB 10 MCG (400 UNIT) SCH UNIT: 10 TAB at 10:32

## 2020-01-22 RX ADMIN — METHYLPREDNISOLONE SODIUM SUCCINATE SCH MLS/HR: 1 INJECTION, POWDER, LYOPHILIZED, FOR SOLUTION INTRAMUSCULAR; INTRAVENOUS at 08:30

## 2020-01-22 RX ADMIN — FAMOTIDINE SCH MG: 10 INJECTION, SOLUTION INTRAVENOUS at 21:13

## 2020-01-22 RX ADMIN — Medication SCH ML: at 22:29

## 2020-01-22 RX ADMIN — DEXTROSE AND SODIUM CHLORIDE SCH MLS/HR: 5; .9 INJECTION, SOLUTION INTRAVENOUS at 22:32

## 2020-01-22 RX ADMIN — ACETAMINOPHEN PRN MG: 325 TABLET ORAL at 21:10

## 2020-01-22 RX ADMIN — HEPARIN SODIUM SCH UNIT: 5000 INJECTION, SOLUTION INTRAVENOUS; SUBCUTANEOUS at 10:32

## 2020-01-22 RX ADMIN — HEPARIN SODIUM SCH UNIT: 5000 INJECTION, SOLUTION INTRAVENOUS; SUBCUTANEOUS at 21:13

## 2020-01-22 RX ADMIN — Medication SCH ML: at 10:34

## 2020-01-22 RX ADMIN — FAMOTIDINE SCH MG: 10 INJECTION, SOLUTION INTRAVENOUS at 10:34

## 2020-01-22 RX ADMIN — ACETAMINOPHEN PRN MG: 325 TABLET ORAL at 06:22

## 2020-01-22 RX ADMIN — DEXTROSE AND SODIUM CHLORIDE SCH MLS/HR: 5; .9 INJECTION, SOLUTION INTRAVENOUS at 08:30

## 2020-01-22 NOTE — PROGRESS NOTE
Assessment and Plan





- Patient Problems


(1) Exacerbation of multiple sclerosis


Current Visit: Yes   Status: Acute   


Plan to address problem: 


IV solumedrol 1 gm q 24 h x5 days


status unchanged she is in day #2 solmedrol 1000 mg 


MRI is consistent with MS and active disease noted in brain with involvment of 

cervical and dorsal spine


she could not tolerate tecfedera 


labs are still pending 


she is with primary MS and secondary progression involving  brain and spinal 

cord 


pt; need follow up with neurology in a [facility] center recommend Dr. Kehinde Serrano 

at [facility] 


finish x3-5 days of Iv solmedrol then follow up with neurology


review labs


add Vit D daily 400 mg 














(2) Acute nonspecific chest pain with low risk of coronary artery disease


Current Visit: Yes   Status: Acute   


Plan to address problem: 


No further w/u








(3) DVT prophylaxis


Current Visit: Yes   Status: Acute   


Plan to address problem: 


on Heparin and GI prophylaxis








Subjective


Date of service: 01/22/20


Principal diagnosis: MS exacerbation


Interval history: 


Doing better.








Objective





- Constitutional


Vitals: 


                               Vital Signs - 12hr











  01/22/20





  11:28


 


Temperature 98.3 F


 


Pulse Rate 70


 


Respiratory 20





Rate 


 


Blood Pressure 116/57


 


O2 Sat by Pulse 96





Oximetry 











General appearance: Present: no acute distress, well-nourished





- EENT


Eyes: PERRL, EOM intact


ENT: hearing intact, clear oral mucosa


Ears: bilateral: normal





- Neck


Neck: supple, normal ROM





- Respiratory


Respiratory effort: normal


Respiratory: bilateral: CTA





- Breasts


Breasts: normal





- Cardiovascular


Rhythm: regular


Heart Sounds: Present: S1 & S2.  Absent: gallop, rub


Extremities: pulses intact, No edema, normal color, Full ROM





- Gastrointestinal


General gastrointestinal: Present: soft, non-tender, non-distended, normal bowel

sounds





- Genitourinary


Female genitourinary: normal





- Integumentary


Integumentary: clear, warm, dry





- Musculoskeletal


Musculoskeletal: strength equal bilaterally, generalized weakness





- Neurologic


Neurologic: focal deficits (Both loqwer extremities), moves all extremities





- Psychiatric


Psychiatric: memory intact, appropriate mood/affect, intact judgment & insight





- Allied health notes


Allied health notes reviewed: nursing, PT, case management





- Labs


CBC & Chem 7: 


                                 01/22/20 04:37





                                 01/22/20 04:37


Labs: 


                              Abnormal lab results











  01/22/20 01/22/20 Range/Units





  04:37 04:37 


 


WBC  11.6 H   (4.5-11.0)  K/mm3


 


RDW  16.2 H   (13.2-15.2)  %


 


Seg Neuts % (Manual)  90.0 H   (40.0-70.0)  %


 


Lymphocytes % (Manual)  6.0 L   (13.4-35.0)  %


 


Seg Neutrophils # Man  10.4 H   (1.8-7.7)  K/mm3


 


Lymphocytes # (Manual)  0.7 L   (1.2-5.4)  K/mm3


 


Creatinine   0.5 L  (0.7-1.2)  mg/dL


 


Glucose   113 H  ()  mg/dL

## 2020-01-22 NOTE — PROGRESS NOTE
Subjective


Date of service: 01/22/20


Principal diagnosis: MS


Interval history: 





status unchanged she is in day #2 solmedrol 1000 mg 


MRI is consistent with MS and active disease noted in brain with involvment of 

cervical and dorsal spine


she could not tolerate tecfedera 


labs are still pending 


she is with primary MS and secondary progressive .involve brain and spinal cord 


pt; need follow up with neurology in a teaching center recommend Dr. Kehinde Serrano 

at [facility] 


finish x3-5 days of Iv solmedrol then follow up with neurology


review labs


add Vit D daily 400 mg 





will sign off 


finding is D/W pt. 





Objective





- Vital Sign


                               Vital Signs - 12hr











  01/21/20 01/22/20





  23:31 05:21


 


Temperature 98.0 F 98.6 F


 


Pulse Rate 67 60


 


Respiratory 16 18





Rate  


 


Blood Pressure  101/53


 


Blood Pressure 106/43 





[Left]  


 


O2 Sat by Pulse 98 97





Oximetry  














- Laboratory Findings


CBC and BMP: 


                                 01/22/20 04:37





                                 01/22/20 04:37


Abnormal Lab Findings: 


                                  Abnormal Labs











  01/20/20 01/20/20 01/21/20





  15:07 15:07 05:30


 


WBC  12.7 H  


 


RDW  17.0 H   16.5 H


 


Lymph % (Auto)  8.8 L  


 


Mono % (Auto)    8.0 H


 


Lymph #  1.1 L  


 


Seg Neutrophils %  85.9 H  


 


Seg Neuts % (Manual)   


 


Lymphocytes % (Manual)   


 


Seg Neutrophils #  10.9 H  


 


Seg Neutrophils # Man   


 


Lymphocytes # (Manual)   


 


Chloride   107.5 H 


 


Carbon Dioxide   21 L 


 


Creatinine   


 


Glucose   103 H 


 


Total Protein   


 


Albumin   














  01/21/20 01/22/20 01/22/20





  05:30 04:37 04:37


 


WBC   11.6 H 


 


RDW   16.2 H 


 


Lymph % (Auto)   


 


Mono % (Auto)   


 


Lymph #   


 


Seg Neutrophils %   


 


Seg Neuts % (Manual)   90.0 H 


 


Lymphocytes % (Manual)   6.0 L 


 


Seg Neutrophils #   


 


Seg Neutrophils # Man   10.4 H 


 


Lymphocytes # (Manual)   0.7 L 


 


Chloride  109.6 H  


 


Carbon Dioxide  21 L  


 


Creatinine    0.5 L


 


Glucose    113 H


 


Total Protein  5.9 L  


 


Albumin  3.4 L

## 2020-01-23 RX ADMIN — CHOLECALCIFEROL TAB 10 MCG (400 UNIT) SCH UNIT: 10 TAB at 09:46

## 2020-01-23 RX ADMIN — HEPARIN SODIUM SCH UNIT: 5000 INJECTION, SOLUTION INTRAVENOUS; SUBCUTANEOUS at 22:37

## 2020-01-23 RX ADMIN — Medication SCH: at 11:16

## 2020-01-23 RX ADMIN — FAMOTIDINE SCH MG: 20 TABLET ORAL at 22:37

## 2020-01-23 RX ADMIN — FAMOTIDINE SCH MG: 10 INJECTION, SOLUTION INTRAVENOUS at 09:46

## 2020-01-23 RX ADMIN — HEPARIN SODIUM SCH UNIT: 5000 INJECTION, SOLUTION INTRAVENOUS; SUBCUTANEOUS at 09:46

## 2020-01-23 RX ADMIN — METHYLPREDNISOLONE SODIUM SUCCINATE SCH MLS/HR: 1 INJECTION, POWDER, LYOPHILIZED, FOR SOLUTION INTRAMUSCULAR; INTRAVENOUS at 09:51

## 2020-01-24 RX ADMIN — DEXTROSE AND SODIUM CHLORIDE SCH MLS/HR: 5; .9 INJECTION, SOLUTION INTRAVENOUS at 21:51

## 2020-01-24 RX ADMIN — DEXTROSE AND SODIUM CHLORIDE SCH MLS/HR: 5; .9 INJECTION, SOLUTION INTRAVENOUS at 06:11

## 2020-01-24 RX ADMIN — HYDROMORPHONE HYDROCHLORIDE PRN MG: 1 INJECTION, SOLUTION INTRAMUSCULAR; INTRAVENOUS; SUBCUTANEOUS at 22:02

## 2020-01-24 RX ADMIN — HEPARIN SODIUM SCH UNIT: 5000 INJECTION, SOLUTION INTRAVENOUS; SUBCUTANEOUS at 09:10

## 2020-01-24 RX ADMIN — Medication SCH ML: at 22:11

## 2020-01-24 RX ADMIN — FAMOTIDINE SCH MG: 20 TABLET ORAL at 21:51

## 2020-01-24 RX ADMIN — METHYLPREDNISOLONE SODIUM SUCCINATE SCH MLS/HR: 1 INJECTION, POWDER, LYOPHILIZED, FOR SOLUTION INTRAMUSCULAR; INTRAVENOUS at 09:08

## 2020-01-24 RX ADMIN — FAMOTIDINE SCH MG: 20 TABLET ORAL at 09:10

## 2020-01-24 RX ADMIN — CHOLECALCIFEROL TAB 10 MCG (400 UNIT) SCH UNIT: 10 TAB at 09:10

## 2020-01-24 RX ADMIN — Medication SCH: at 09:12

## 2020-01-24 RX ADMIN — HEPARIN SODIUM SCH UNIT: 5000 INJECTION, SOLUTION INTRAVENOUS; SUBCUTANEOUS at 21:50

## 2020-01-24 RX ADMIN — Medication SCH: at 06:12

## 2020-01-24 NOTE — PROGRESS NOTE
Assessment and Plan


Assessment and plan: 





MS exacerbation.  Neurology following.  Continue Solu-Medrol 1 g for 3 to 5 

days.  Anticipate discharge in a.m. if continues to improve.  MRI is consistent 

with MS and active disease noted in brain with involvment of cervical and dorsal

spine


she could not tolerate tecfedera  





DVT prophylaxis per





History


Interval history: 





No new issues overnight.  Patient states that she feels improved





Hospitalist Physical





- Constitutional


Vitals: 


                                        











Temp Pulse Resp BP Pulse Ox


 


 98.6 F   63   18   113/65   99 


 


 01/24/20 06:00  01/24/20 06:00  01/24/20 06:00  01/24/20 06:00  01/24/20 06:00











General appearance: Present: no acute distress, well-nourished





- EENT


Eyes: Present: PERRL, EOM intact


ENT: hearing intact, clear oral mucosa, dentition normal





- Neck


Neck: Present: supple, normal ROM





- Respiratory


Respiratory effort: normal


Respiratory: bilateral: CTA





- Cardiovascular


Rhythm: regular


Heart Sounds: Present: S1 & S2.  Absent: gallop, rub





- Extremities


Extremities: no ischemia, No edema, Full ROM





- Abdominal


General gastrointestinal: soft, non-tender, non-distended, normal bowel sounds





- Integumentary


Integumentary: Present: clear, warm, dry





- Neurologic


Neurologic: CNII-XII intact, moves all extremities





BISHNU score





- Bishnu Score


Age > 65: (0) No


Aspirin use within the Past 7 Days: (0) No


3 or more CAD Risk Factors: (0) No


2 or more Angina events in past 24 hrs: (0) No


Known CAD with more than 50% Stenosis: (0) No


Elevated Cardiac Markers: (0) No


ST Deviation Greater than 0.5mm: (0) No


BISHNU Score: 0





Results





- Labs


CBC & Chem 7: 


                                 01/22/20 04:37





                                 01/22/20 04:37


Labs: 


                             Laboratory Last Values











WBC  11.6 K/mm3 (4.5-11.0)  H  01/22/20  04:37    


 


RBC  4.14 M/mm3 (3.65-5.03)   01/22/20  04:37    


 


Hgb  12.0 gm/dl (10.1-14.3)   01/22/20  04:37    


 


Hct  35.9 % (30.3-42.9)   01/22/20  04:37    


 


MCV  87 fl (79-97)   01/22/20  04:37    


 


MCH  29 pg (28-32)   01/22/20  04:37    


 


MCHC  33 % (30-34)   01/22/20  04:37    


 


RDW  16.2 % (13.2-15.2)  H  01/22/20  04:37    


 


Plt Count  287 K/mm3 (140-440)   01/22/20  04:37    


 


Lymph % (Auto)  34.6 % (13.4-35.0)   01/21/20  05:30    


 


Mono % (Auto)  8.0 % (0.0-7.3)  H  01/21/20  05:30    


 


Eos % (Auto)  0.7 % (0.0-4.3)   01/21/20  05:30    


 


Baso % (Auto)  0.3 % (0.0-1.8)   01/21/20  05:30    


 


Lymph #  2.9 K/mm3 (1.2-5.4)   01/21/20  05:30    


 


Mono #  0.7 K/mm3 (0.0-0.8)   01/21/20  05:30    


 


Eos #  0.1 K/mm3 (0.0-0.4)   01/21/20  05:30    


 


Baso #  0.0 K/mm3 (0.0-0.1)   01/21/20  05:30    


 


Add Manual Diff  Complete   01/22/20  04:37    


 


Total Counted  100   01/22/20  04:37    


 


Seg Neutrophils %  Np   01/22/20  04:37    


 


Seg Neuts % (Manual)  90.0 % (40.0-70.0)  H  01/22/20  04:37    


 


Band Neutrophils %  0 %  01/22/20  04:37    


 


Lymphocytes % (Manual)  6.0 % (13.4-35.0)  L  01/22/20  04:37    


 


Reactive Lymphs % (Man)  0 %  01/22/20  04:37    


 


Monocytes % (Manual)  4.0 % (0.0-7.3)   01/22/20  04:37    


 


Eosinophils % (Manual)  0 % (0.0-4.3)   01/22/20  04:37    


 


Basophils % (Manual)  0 % (0.0-1.8)   01/22/20  04:37    


 


Metamyelocytes %  0 %  01/22/20  04:37    


 


Myelocytes %  0 %  01/22/20  04:37    


 


Promyelocytes %  0 %  01/22/20  04:37    


 


Blast Cells %  0 %  01/22/20  04:37    


 


Nucleated RBC %  Not Reportable   01/22/20  04:37    


 


Seg Neutrophils #  4.7 K/mm3 (1.8-7.7)   01/21/20  05:30    


 


Seg Neutrophils # Man  10.4 K/mm3 (1.8-7.7)  H  01/22/20  04:37    


 


Band Neutrophils #  0.0 K/mm3  01/22/20  04:37    


 


Lymphocytes # (Manual)  0.7 K/mm3 (1.2-5.4)  L  01/22/20  04:37    


 


Abs React Lymphs (Man)  0.0 K/mm3  01/22/20  04:37    


 


Monocytes # (Manual)  0.5 K/mm3 (0.0-0.8)   01/22/20  04:37    


 


Eosinophils # (Manual)  0.0 K/mm3 (0.0-0.4)   01/22/20  04:37    


 


Basophils # (Manual)  0.0 K/mm3 (0.0-0.1)   01/22/20  04:37    


 


Metamyelocytes #  0.0 K/mm3  01/22/20  04:37    


 


Myelocytes #  0.0 K/mm3  01/22/20  04:37    


 


Promyelocytes #  0.0 K/mm3  01/22/20  04:37    


 


Blast Cells #  0.0 K/mm3  01/22/20  04:37    


 


WBC Morphology  Not Reportable   01/22/20  04:37    


 


Hypersegmented Neuts  Not Reportable   01/22/20  04:37    


 


Hyposegmented Neuts  Not Reportable   01/22/20  04:37    


 


Hypogranular Neuts  Not Reportable   01/22/20  04:37    


 


Smudge Cells  Not Reportable   01/22/20  04:37    


 


Toxic Granulation  Not Reportable   01/22/20  04:37    


 


Toxic Vacuolation  Not Reportable   01/22/20  04:37    


 


Dohle Bodies  Not Reportable   01/22/20  04:37    


 


Pelger-Huet Anomaly  Not Reportable   01/22/20  04:37    


 


Teresa Rods  Not Reportable   01/22/20  04:37    


 


Platelet Estimate  Consistent w auto   01/22/20  04:37    


 


Clumped Platelets  Not Reportable   01/22/20  04:37    


 


Plt Clumps, EDTA  Not Reportable   01/22/20  04:37    


 


Large Platelets  Not Reportable   01/22/20  04:37    


 


Giant Platelets  Not Reportable   01/22/20  04:37    


 


Platelet Satelliting  Not Reportable   01/22/20  04:37    


 


Plt Morphology Comment  Not Reportable   01/22/20  04:37    


 


RBC Morphology  Normal   01/22/20  04:37    


 


Dimorphic RBCs  Not Reportable   01/22/20  04:37    


 


Polychromasia  Not Reportable   01/22/20  04:37    


 


Hypochromasia  Not Reportable   01/22/20  04:37    


 


Poikilocytosis  Not Reportable   01/22/20  04:37    


 


Anisocytosis  Not Reportable   01/22/20  04:37    


 


Microcytosis  Not Reportable   01/22/20  04:37    


 


Macrocytosis  Not Reportable   01/22/20  04:37    


 


Spherocytes  Not Reportable   01/22/20  04:37    


 


Pappenheimer Bodies  Not Reportable   01/22/20  04:37    


 


Sickle Cells  Not Reportable   01/22/20  04:37    


 


Target Cells  Not Reportable   01/22/20  04:37    


 


Tear Drop Cells  Not Reportable   01/22/20  04:37    


 


Ovalocytes  Not Reportable   01/22/20  04:37    


 


Helmet Cells  Not Reportable   01/22/20  04:37    


 


Julien-Kasson Bodies  Not Reportable   01/22/20  04:37    


 


Cabot Rings  Not Reportable   01/22/20  04:37    


 


[place] Cells  Not Reportable   01/22/20  04:37    


 


Bite Cells  Not Reportable   01/22/20  04:37    


 


Crenated Cell  Not Reportable   01/22/20  04:37    


 


Elliptocytes  Not Reportable   01/22/20  04:37    


 


Acanthocytes (Spur)  Not Reportable   01/22/20  04:37    


 


Rouleaux  Not Reportable   01/22/20  04:37    


 


Hemoglobin C Crystals  Not Reportable   01/22/20  04:37    


 


Schistocytes  Not Reportable   01/22/20  04:37    


 


Malaria parasites  Not Reportable   01/22/20  04:37    


 


ESR  4 mm/Hr (0-20)   01/21/20  12:00    


 


Mirza Bodies  Not Reportable   01/22/20  04:37    


 


Hem Pathologist Commnt  No   01/22/20  04:37    


 


D-Dimer  224.77 ng/mlDDU (0-234)   01/20/20  15:32    


 


Sodium  140 mmol/L (137-145)   01/22/20  04:37    


 


Potassium  4.2 mmol/L (3.6-5.0)   01/22/20  04:37    


 


Chloride  104.5 mmol/L ()   01/22/20  04:37    


 


Carbon Dioxide  23 mmol/L (22-30)   01/22/20  04:37    


 


Anion Gap  17 mmol/L  01/22/20  04:37    


 


BUN  10 mg/dL (7-17)   01/22/20  04:37    


 


Creatinine  0.5 mg/dL (0.7-1.2)  L  01/22/20  04:37    


 


Estimated GFR  > 60 ml/min  01/22/20  04:37    


 


BUN/Creatinine Ratio  20 %  01/22/20  04:37    


 


Glucose  113 mg/dL ()  H  01/22/20  04:37    


 


Hemoglobin A1c  4.9 % (4-6)   01/21/20  05:30    


 


Calcium  9.1 mg/dL (8.4-10.2)   01/22/20  04:37    


 


Total Bilirubin  0.20 mg/dL (0.1-1.2)   01/21/20  05:30    


 


AST  11 units/L (5-40)   01/21/20  05:30    


 


ALT  7 units/L (7-56)   01/21/20  05:30    


 


Alkaline Phosphatase  41 units/L ()   01/21/20  05:30    


 


Troponin T  < 0.010 ng/mL (0.00-0.029)   01/20/20  15:32    


 


Total Protein  5.9 g/dL (6.3-8.2)  L  01/21/20  05:30    


 


Albumin  3.4 g/dL (3.9-5)  L  01/21/20  05:30    


 


Albumin/Globulin Ratio  1.4 %  01/21/20  05:30    


 


Vitamin B12  261.4 pg/mL (211-911)   01/21/20  12:00    


 


Urine Color  Yellow  (Yellow)   01/20/20  14:59    


 


Urine Turbidity  Clear  (Clear)   01/20/20  14:59    


 


Urine pH  6.0  (5.0-7.0)   01/20/20  14:59    


 


Ur Specific Gravity  1.029  (1.003-1.030)   01/20/20  14:59    


 


Urine Protein  <15 mg/dl mg/dL (Negative)   01/20/20  14:59    


 


Urine Glucose (UA)  Neg mg/dL (Negative)   01/20/20  14:59    


 


Urine Ketones  Neg mg/dL (Negative)   01/20/20  14:59    


 


Urine Blood  Mod  (Negative)   01/20/20  14:59    


 


Urine Nitrite  Neg  (Negative)   01/20/20  14:59    


 


Urine Bilirubin  Neg  (Negative)   01/20/20  14:59    


 


Urine Urobilinogen  < 2.0 mg/dL (<2.0)   01/20/20  14:59    


 


Ur Leukocyte Esterase  Neg  (Negative)   01/20/20  14:59    


 


Urine WBC (Auto)  1.0 /HPF (0.0-6.0)   01/20/20  14:59    


 


Urine RBC (Auto)  2.0 /HPF (0.0-6.0)   01/20/20  14:59    


 


U Epithel Cells (Auto)  2.0 /HPF (0-13.0)   01/20/20  14:59    


 


Urine Mucus  1+ /HPF  01/20/20  14:59    


 


Urine HCG, Qual  Negative  (Negative)   01/20/20  14:59    














Active Medications





- Current Medications


Current Medications: 














Generic Name Dose Route Start Last Admin





  Trade Name Freq  PRN Reason Stop Dose Admin


 


Acetaminophen  650 mg  01/20/20 22:07  01/22/20 21:10





  Tylenol  PO   650 mg





  Q4H PRN   Administration





  Pain MILD(1-3)/Fever >100.5/HA  


 


Cholecalciferol  400 unit  01/22/20 10:00  01/24/20 09:10





  Vitamin D3  PO   400 unit





  QDAY COLLEEN   Administration


 


Famotidine  20 mg  01/23/20 22:00  01/24/20 09:10





  Pepcid  PO   20 mg





  BID COLLEEN   Administration


 


Heparin Sodium (Porcine)  5,000 unit  01/20/20 22:15  01/24/20 09:10





  Heparin  SUB-Q   5,000 unit





  Q12HR COLLEEN   Administration


 


Hydromorphone HCl  0.5 mg  01/20/20 22:07 





  Dilaudid  IV  





  Q3H PRN  





  Pain , Severe (7-10)  


 


Dextrose/Sodium Chloride  1,000 mls @ 75 mls/hr  01/20/20 23:00  01/24/20 06:11





  D5ns  IV   75 mls/hr





  AS DIRECT COLLEEN   Administration


 


Methylprednisolone Sodium  250 mls @ 250 mls/hr  01/21/20 08:00  01/24/20 09:08





Succinate 1,000 mg/ Sodium  IV  01/25/20 08:59  250 mls/hr





Chloride  Q24H COLLEEN   Administration


 


Ondansetron HCl  4 mg  01/20/20 22:07 





  Zofran  IV  





  Q8H PRN  





  Nausea And Vomiting  


 


Sodium Chloride  10 ml  01/21/20 10:00  01/24/20 09:12





  Sodium Chloride Flush Syringe 10 Ml  IV   Not Given





  BID COLLEEN  


 


Sodium Chloride  10 ml  01/20/20 22:07  01/23/20 22:38





  Sodium Chloride Flush Syringe 10 Ml  IV   10 ml





  PRN PRN   Administration





  LINE FLUSH

## 2020-01-25 VITALS — SYSTOLIC BLOOD PRESSURE: 133 MMHG | DIASTOLIC BLOOD PRESSURE: 79 MMHG

## 2020-01-25 LAB
ALBUMIN SERPL-MCNC: 3.5 G/DL (ref 3.8–4.8)
GAMMA GLOB SERPL ELPH-MCNC: 0.9 G/DL (ref 0.8–1.7)

## 2020-01-25 RX ADMIN — HYDROMORPHONE HYDROCHLORIDE PRN MG: 1 INJECTION, SOLUTION INTRAMUSCULAR; INTRAVENOUS; SUBCUTANEOUS at 06:04

## 2020-01-25 RX ADMIN — METHYLPREDNISOLONE SODIUM SUCCINATE SCH MLS/HR: 1 INJECTION, POWDER, LYOPHILIZED, FOR SOLUTION INTRAMUSCULAR; INTRAVENOUS at 09:55

## 2020-01-25 RX ADMIN — FAMOTIDINE SCH MG: 20 TABLET ORAL at 10:14

## 2020-01-25 RX ADMIN — CHOLECALCIFEROL TAB 10 MCG (400 UNIT) SCH UNIT: 10 TAB at 10:14

## 2020-01-25 RX ADMIN — HEPARIN SODIUM SCH: 5000 INJECTION, SOLUTION INTRAVENOUS; SUBCUTANEOUS at 10:16

## 2020-01-25 NOTE — DISCHARGE SUMMARY
Providers





- Providers


Date of Admission: 


01/21/20 16:12





Date of discharge: 01/25/20


Attending physician: 


MATTY SALAZAR





                                        





01/20/20 22:07


Consult to Physician [CONS] Routine 


   Comment: 


   Consulting Provider: CHRISTEL CALIX


   Physician Instructions: 


   Reason For Exam: MS??





01/24/20 08:45


Physical Therapy Evaluation and Treat [CONS] Routine 


   Comment: 


   Reason For Exam: generalized weakness











Primary care physician: 


PRIMARY CARE MD








Hospitalization


Reason for admission: MS exacerbation


Condition: Stable


Hospital course: 





This is 19 years old female presented to hospital with recurrent blurred vision 

and tremor both upper and dizziness symptoms started 7 days PTA.  The patient 

reported that she was diagnosed with MS February 2019 in [place].  Patient 

reports that she underwent MRI brain and LP for diagnosis and was started on 

tecfedera but she could not take due to side of nausea she stopped medication 

after a month , she denied follow up with neurology.  The patient reported that 

since last year she has had at least 6 relapses with treatment x 3 with IV 

steroid.  Neurology saw the patient in consultation and recommended Solu-Medrol 

for 3-5 days.  Neurology also order MRI of the brain cervical and thoracic spine

 which revealed findings consistent with MS and active disease noted in brain 

with involvment of cervical and dorsal spine.  Neurology also recommended 

further follow-up at [place] with Dr. Chad Serrano.  Patient has returned back to her

 baseline neurological function.  Therefore, patient will be discharged home and

 is to follow-up as an outpatient.  Dedicated discharge time 32 minutes.


Disposition: DC-01 TO HOME OR SELFCARE


Time spent for discharge: 32





- Discharge Diagnoses


(1) Exacerbation of multiple sclerosis


Status: Acute   





Core Measure Documentation





- Palliative Care


Palliative Care/ Comfort Measures: Not Applicable





- Core Measures


Any of the following diagnoses?: none





Exam





- Constitutional


Vitals: 


                                        











Temp Pulse Resp BP Pulse Ox


 


 98.2 F   53 L  18   133/79   96 


 


 01/25/20 05:26  01/25/20 05:26  01/25/20 05:26  01/25/20 05:26  01/25/20 05:26











General appearance: Present: no acute distress, well-nourished





- EENT


Eyes: Present: PERRL


ENT: hearing intact, clear oral mucosa





- Neck


Neck: Present: supple, normal ROM





- Respiratory


Respiratory effort: normal


Respiratory: bilateral: CTA





- Cardiovascular


Heart Sounds: Present: S1 & S2.  Absent: rub, click





- Extremities


Extremities: pulses symmetrical, No edema


Peripheral Pulses: within normal limits





- Abdominal


General gastrointestinal: Present: soft, non-tender, non-distended, normal bowel

 sounds


Female genitourinary: Present: normal





- Integumentary


Integumentary: Present: clear, warm, dry





- Musculoskeletal


Musculoskeletal: gait normal, strength equal bilaterally





- Psychiatric


Psychiatric: appropriate mood/affect, intact judgment & insight





- Neurologic


Neurologic: CNII-XII intact, moves all extremities





Plan


Activity: advance as tolerated


Weight Bearing Status: Weight Bear as Tolerated


Diet: regular


Follow up with: 


PRIMARY CARE,MD [Primary Care Provider] - 7 Days


Prescriptions: 


Famotidine [Pepcid] 20 mg PO BID #60 tablet


Cholecalciferol Vit D3 [Vitamin D3 400 UNIT TAB] 400 unit PO QDAY #30 tablet

## 2020-01-25 NOTE — PROGRESS NOTE
Assessment and Plan





- Patient Problems


(1) Exacerbation of multiple sclerosis


Status: Acute   


Plan to address problem: 


IV solumedrol 1 gm q 24 h x5 days


status unchanged she is in day #3 solmedrol 1000 mg 


MRI is consistent with MS and active disease noted in brain with involvment of 

cervical and dorsal spine


she could not tolerate tecfedera 





she is with primary MS and secondary progression involving  brain and spinal 

cord 


pt; need [facility]y [facility] recommend Dr. Kehinde Serrano 

at [place] 


finish x3-5 days of Iv solmedrol then follow up with neurology


review labs

















(2) Acute nonspecific chest pain with low risk of coronary artery disease


Status: Acute   


Plan to address problem: 


No further w/u








(3) DVT prophylaxis


Status: Acute   


Plan to address problem: 


on Heparin and GI prophylaxis








Subjective


Date of service: 01/23/20


Principal diagnosis: MS exacerbation


Interval history: 


Doing better.








Objective





- Constitutional


General appearance: Present: no acute distress, well-nourished





- EENT


Eyes: PERRL, EOM intact


ENT: hearing intact, clear oral mucosa


Ears: bilateral: normal





- Neck


Neck: supple, normal ROM





- Respiratory


Respiratory effort: normal


Respiratory: bilateral: CTA





- Breasts


Breasts: normal





- Cardiovascular


Heart rate: 78


Rhythm: regular


Heart Sounds: Present: S1 & S2.  Absent: gallop, rub


Extremities: pulses intact, No edema, normal color, Full ROM





- Gastrointestinal


General gastrointestinal: Present: soft, non-tender, non-distended, normal bowel

sounds





- Genitourinary


Female genitourinary: normal





- Integumentary


Integumentary: clear, warm, dry





- Musculoskeletal


Musculoskeletal: 1, strength equal bilaterally





- Neurologic


Neurologic: moves all extremities





- Psychiatric


Psychiatric: memory intact, appropriate mood/affect, intact judgment & insight





- Allied health notes


Allied health notes reviewed: nursing, case management





- Labs


CBC & Chem 7: 


                                 01/22/20 04:37





                                 01/22/20 04:37


Labs: 


                              Abnormal lab results











  01/21/20 Range/Units





  12:00 


 


Serum Total Protein  5.7 L  (6.1-8.1)  g/dL


 


Albumin  3.5 L  (3.8-4.8)  g/dL